# Patient Record
Sex: FEMALE | URBAN - METROPOLITAN AREA
[De-identification: names, ages, dates, MRNs, and addresses within clinical notes are randomized per-mention and may not be internally consistent; named-entity substitution may affect disease eponyms.]

---

## 2020-07-01 ENCOUNTER — OFFICE VISIT (OUTPATIENT)
Dept: NEUROLOGY | Age: 20
End: 2020-07-01

## 2020-07-01 VITALS
WEIGHT: 135 LBS | HEIGHT: 63 IN | HEART RATE: 84 BPM | TEMPERATURE: 97.5 F | BODY MASS INDEX: 23.92 KG/M2 | SYSTOLIC BLOOD PRESSURE: 112 MMHG | DIASTOLIC BLOOD PRESSURE: 86 MMHG | OXYGEN SATURATION: 98 %

## 2020-07-01 DIAGNOSIS — G44.85 STABBING HEADACHE: Primary | ICD-10-CM

## 2020-07-01 RX ORDER — PREDNISONE 10 MG/1
TABLET ORAL
Qty: 21 TAB | Refills: 0 | Status: SHIPPED | OUTPATIENT
Start: 2020-07-01

## 2020-07-01 RX ORDER — ETODOLAC 400 MG/1
TABLET, FILM COATED ORAL AS NEEDED
COMMUNITY
Start: 2020-06-25

## 2020-07-01 NOTE — PROGRESS NOTES
Head pain that comes and goes. She feels off and wobbly. [facility] ([id number] referred her here. Not sure which provider she saw.

## 2020-07-01 NOTE — PROGRESS NOTES
Name:  Maura Escobar      :  2000    PCP:   No primary care provider on file. Referring:  Sarah Antonio D.O. / [facility] ([facility])  MRN:   <[id number]>    Chief Complaint:   Chief Complaint   Patient presents with    Head Pain       HISTORY OF PRESENT ILLNESS:     This is a 23 y.o. female with PMHx Anxiety who presents to discuss Head Pain    For past 2 weeks has been having episodic pain on right side/ back of head, sometimes on left side. Describes that it's a shock like sensation that is brief, can occur intermittent throughout the day or in clusters. No vomiting, no nausea, no light or sound sensitivity. Does feel mild lightheaded when having the head pain. No loss or alteration of awareness. Happening daily since it began. Rates the pain as 7/ 10 when it occurs. Denies any head or neck injuries before the head pains began. Reports that she has chronic severe anxiety, many things trigger it. Denies any new significant stressors. Was taking Advil for headache but didn't help. She was recently prescribed Etodolac at [facility] and says it alleviates her head pain for rest of the day, but head pain returns the following day. Complete Review of Systems: + headache ; otherwise as noted in HPI     PMHx:  History reviewed. No pertinent past medical history. PSH:  History reviewed. No pertinent surgical history. FHx:  family history is not on file. SHx:  reports that she has been smoking. She has been smoking about 0.25 packs per day. She has never used smokeless tobacco. She reports previous alcohol use. Allergies:   No Known Allergies    Meds:     Current Outpatient Medications   Medication Sig    etodolac (LODINE) 400 mg tablet as needed.  predniSONE (DELTASONE) 10 mg tablet Take 6 tabs on Day 1, then reduce by 1 tablet a day over the next 5 days till done. Take in AM with food.        PHYSICAL EXAM    Vitals:    20 0918   BP: 112/86   BP Patient Position: Sitting   Pulse: 84   Temp: 97.5 °F (36.4 °C)   SpO2: 98%   Weight: 61.2 kg (135 lb)   Height: 5' 3\" (1.6 m)       General:  Alert, cooperative, NAD, appears mild-moderately anxious   Head:  Normocephalic, atraumatic. Eyes:  Conjunctivae/corneas clear   Lungs:  Heart:  Not examined, not relevant  Not examined, not relevant    Extremities: No edema. Skin: No rashes    Neurologic Exam       Language: normal  Memory:  Alert, oriented to person, place, situation    Cranial Nerves:  I: smell Not tested   II: visual fields Full to confrontation   II: pupils Equal, round, reactive to light   II: optic disc No papilledema   III,VII: ptosis none   III,IV,VI: extraocular muscles  normal   V: facial light touch sensation  normal   VII: facial muscle function  symmetric   VIII: hearing symmetric   IX: soft palate elevation  normal   XI: sternocleidomastoid strength 5/5   XII: tongue  midline      Motor: normal bulk, tone, strength in all exts  Sensory: intact to LT, PP x 4 exts   Cerebellar: no rest, postural, or intention tremor  Normal FNF and H-Shin bilaterally  Reflexes: 1+ Biceps, 3+ patellars, 1+ achilles (all symmetric)   Plantar response: neutral bilaterally; no clonus at ankles  Gait: normal gait including tandem  Romberg negative     ASSESSMENT AND PLAN      ICD-10-CM ICD-9-CM    1. Stabbing headache G44.85 339.85 MRI BRAIN W WO CONT      predniSONE (DELTASONE) 10 mg tablet       23 y.o. female with new-onset stabbing headache (right side more than left). Pattern is not suggestive of migraine or tension headache. Normal, non-focal neurological exam.  Will check MRI Brain with and without contrast to evaluate for underlying structural abnormalities (tumors, masses, white matter lesions, etc) as a cause of her head pain. Rx'd 6 day course of prednisone to try and break her ongoing headache cycle. Common SEFx discussed.   If not responsive to short course of steroid, Melatonin 5 mg QHS may be helpful to reduce stabbing headache. Follow up in 4-5 weeks. Thank you for allowing me to be part of [facility]. Please contact the Neurology office at [phone] if you have any questions regarding her evaluation.       Signed By: Román Smith MD     July 1, 2020

## 2023-05-19 RX ORDER — ETODOLAC 400 MG/1
TABLET, FILM COATED ORAL PRN
COMMUNITY
Start: 2020-06-25

## 2023-05-19 RX ORDER — PREDNISONE 10 MG/1
TABLET ORAL
COMMUNITY
Start: 2020-07-01

## 2023-05-22 ENCOUNTER — HOSPITAL ENCOUNTER (EMERGENCY)
Facility: HOSPITAL | Age: 23
Discharge: HOME | End: 2023-05-22
Attending: EMERGENCY MEDICINE
Payer: MEDICAID

## 2023-05-22 VITALS
BODY MASS INDEX: 28.07 KG/M2 | OXYGEN SATURATION: 100 % | DIASTOLIC BLOOD PRESSURE: 88 MMHG | WEIGHT: 143 LBS | RESPIRATION RATE: 18 BRPM | TEMPERATURE: 98.1 F | HEART RATE: 92 BPM | HEIGHT: 60 IN | SYSTOLIC BLOOD PRESSURE: 130 MMHG
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
## 2023-05-22 DIAGNOSIS — R51.9 ACUTE NONINTRACTABLE HEADACHE, UNSPECIFIED HEADACHE TYPE: Primary | ICD-10-CM

## 2023-05-22 PROCEDURE — 99283 EMERGENCY DEPT VISIT LOW MDM: CPT

## 2023-05-22 PROCEDURE — 6370000000 HC RX 637 (ALT 250 FOR IP)

## 2023-05-22 RX ORDER — ACETAMINOPHEN 500 MG
1000 TABLET ORAL
Status: COMPLETED | OUTPATIENT
Start: 2023-05-22 | End: 2023-05-22

## 2023-05-22 RX ADMIN — ACETAMINOPHEN 1000 MG: 500 TABLET ORAL at 21:33

## 2023-05-22 ASSESSMENT — PAIN DESCRIPTION - LOCATION: LOCATION: HEAD

## 2023-05-22 ASSESSMENT — PAIN SCALES - GENERAL: PAINLEVEL_OUTOF10: 4

## 2023-05-22 ASSESSMENT — PAIN - FUNCTIONAL ASSESSMENT: PAIN_FUNCTIONAL_ASSESSMENT: 0-10

## 2023-05-23 ASSESSMENT — ENCOUNTER SYMPTOMS
CHEST TIGHTNESS: 0
SHORTNESS OF BREATH: 0
NAUSEA: 0
PHOTOPHOBIA: 0
FACIAL SWELLING: 0
BACK PAIN: 0
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
## 2023-05-23 NOTE — DISCHARGE INSTRUCTIONS
Your physical exam today did not show any concerning abnormalities. That being said, please take Tylenol and ibuprofen as needed for any pain over the coming days. We are also attaching information for postconcussion syndrome if you are to experience that. If any new symptoms arise or symptoms worsen, please report to the nearest emergency department. Thank you for allowing us to provide you with medical care today. We realize that you have many choices for your emergency care needs. We thank you for choosing [facility]. Please choose us in the future for any continued health care needs. The exam and treatment you received in the Emergency Department were for an emergent problem and are not intended as complete care. It is important that you follow up with a doctor, nurse practitioner, or physician's assistant for ongoing care. If your symptoms worsen or you do not improve as expected and you are unable to reach your usual health care provider, you should return to the Emergency Department. We are available 24 hours a day. Please make an appointment with your health care provider(s) for follow up of your Emergency Department visit. Take this sheet with you when you go to your follow-up visit.
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
